# Patient Record
Sex: FEMALE | Race: WHITE | NOT HISPANIC OR LATINO | Employment: OTHER
[De-identification: names, ages, dates, MRNs, and addresses within clinical notes are randomized per-mention and may not be internally consistent; named-entity substitution may affect disease eponyms.]

---

## 2017-12-03 ENCOUNTER — HEALTH MAINTENANCE LETTER (OUTPATIENT)
Age: 42
End: 2017-12-03

## 2019-11-20 ENCOUNTER — TELEPHONE (OUTPATIENT)
Dept: ENDOCRINOLOGY | Facility: CLINIC | Age: 44
End: 2019-11-20

## 2019-11-20 NOTE — TELEPHONE ENCOUNTER
[facility] Call Center    Phone Message    May a detailed message be left on voicemail: yes    Reason for Call: Symptoms or Concerns     Patient called and states that she is currently seen at [facility] in [place], not an endocrinologist. Patient said her provider does nothing for patient symptoms. She states that she is currently on medication, and has lost weight, but her TSH is 3.4 as of this week. Patient said she is always cold, nothing warms her up, and she is constantly in brain fog. Patient is requesting a call from a nurse to discuss symptoms and what she can do until her appointment in January, or if there is anyway to get in sooner. [facility] [place] [facility] [place] [facility] [place] [facility] [place].       Action Taken: Message routed to:  Adult Clinics: Endocrinology p [phone]

## 2019-11-22 NOTE — TELEPHONE ENCOUNTER
Patient returned call. Patient reports that she had a thyroidectomy in 2011 and that is when her Papillary Cancer was discovered. Patient notes that it was small and no radioactive iodine treatment. Patient states that she has seen an Endocrinologist and last saw one 1 year ago but no longer wants to see that provider. Patient states that in the meantime she has been seeing her primary care provider and they will not address her thyroid labs, even though she ordered a TSH. Patient notes that she has been in contact with [facility] and they can not get her in with new provider until 12/23/19.     Patient has been added to wait-list for Endocrinology. Patient cannot be seen by by [facility] because they do not see outside referrals.     Patient will ensure her MyChart is active (phone number provided) for sooner appointment notifications. Patient is also going to contact her primary care provider clinic to get in with new provider.        Cristin Lebron RN  Endocrine Care Coordinator  [facility]

## 2019-11-22 NOTE — TELEPHONE ENCOUNTER
There are no sooner appointments for patient currently at [facility]. Attempted to contact patient to offer wait list and fast pass option (patient is active on MyChart). Left voicemail for patient to contact our office.         Cristin Lebron RN  Endocrine Care Coordinator  [facility]

## 2019-11-25 NOTE — TELEPHONE ENCOUNTER
Cancellation for 12/3/19 with Dr. Morelos at 8:00 am. Contacted patient and she would like to come for appointment on that date/time. Appointment scheduled.       Cristin Lebron RN  Endocrine Care Coordinator  [facility]

## 2019-12-03 ENCOUNTER — OFFICE VISIT (OUTPATIENT)
Dept: PSYCHOLOGY | Facility: CLINIC | Age: 44
End: 2019-12-03

## 2019-12-03 ENCOUNTER — OFFICE VISIT (OUTPATIENT)
Dept: ENDOCRINOLOGY | Facility: CLINIC | Age: 44
End: 2019-12-03
Payer: COMMERCIAL

## 2019-12-03 VITALS
OXYGEN SATURATION: 97 % | DIASTOLIC BLOOD PRESSURE: 79 MMHG | HEART RATE: 99 BPM | BODY MASS INDEX: 52.84 KG/M2 | WEIGHT: 293 LBS | SYSTOLIC BLOOD PRESSURE: 122 MMHG

## 2019-12-03 DIAGNOSIS — F43.20 ADJUSTMENT DISORDER, UNSPECIFIED TYPE: Primary | ICD-10-CM

## 2019-12-03 DIAGNOSIS — C73 PAPILLARY THYROID CARCINOMA (H): Primary | ICD-10-CM

## 2019-12-03 DIAGNOSIS — F32.A DEPRESSION, UNSPECIFIED DEPRESSION TYPE: ICD-10-CM

## 2019-12-03 DIAGNOSIS — E66.01 MORBID OBESITY (H): ICD-10-CM

## 2019-12-03 PROCEDURE — 99204 OFFICE O/P NEW MOD 45 MIN: CPT | Performed by: INTERNAL MEDICINE

## 2019-12-03 RX ORDER — AMLODIPINE BESYLATE 5 MG/1
5 TABLET ORAL DAILY
COMMUNITY
Start: 2018-10-08

## 2019-12-03 RX ORDER — DULOXETIN HYDROCHLORIDE 60 MG/1
60 CAPSULE, DELAYED RELEASE ORAL DAILY
COMMUNITY
Start: 2017-11-06

## 2019-12-03 RX ORDER — TRAZODONE HYDROCHLORIDE 100 MG/1
100 TABLET ORAL AT BEDTIME
COMMUNITY
Start: 2019-09-26

## 2019-12-03 NOTE — NURSING NOTE
Ingrid Martin's goals for this visit include:   Chief Complaint   Patient presents with     Consult     Thyroid Disease     She requests these members of her care team be copied on today's visit information: No    PCP: Amisha Gaxiola    Referring Provider:  No referring provider defined for this encounter.    /79 (BP Location: Left arm, Patient Position: Sitting, Cuff Size: Adult Regular)   Pulse 99   Wt 148.5 kg (327 lb 6.4 oz)   SpO2 97%   BMI 52.84 kg/m      Do you need any medication refills at today's visit? No

## 2019-12-03 NOTE — PROGRESS NOTES
"  The patient is self-referred for evaluation of thyroid cancer.  She was previously seen by Dr. Jodi Marte, at [facility]. The patient is concerned about the recurrence of thyroid cancer.    Ingrid Martin is a 44 year old female diagnosed with an incidental 1.5 mm thyroid papillary cancer in August 2011. This was an incidental finding for thyroidectomy for a large adenoma. The surgery was done at [facility], in [place]. I reviewed the pathology report.   \"MICROSCOPIC FOCUS OF PAPILLARY CARCINOMA IN ISTHMUS  SYNOPTIC SUMMARY:  1. PROCEDURE:  TOTAL THYROIDECTOMY  2. SPECIMEN INTEGRITY:  INTACT  3. SPECIMEN SIZE:   - RIGHT LOBE:  4.8 X 3.0 X 2.5 CM   - LEFT LOBE:  6.0 X 2.5 X 2.8 CM   - ISTHMUS:  2.5 X 1.3 X 0.8 CM  4. SPECIMEN WEIGHT:  42 GRAMS  5. TUMOR FOCALITY:  UNIFOCAL  6. TUMOR LATERALITY:  ISTHMUS  7. TUMOR SIZE:  1.5 MM IN GREATEST DIMENSION  8. HISTOLOGIC TYPE:  PAPILLARY CARCINOMA, FOLLICULAR VARIANT  9. HISTOLOGIC GRADE:  G1, WELL DIFFERENTIATED  10. MARGINS:  UNINVOLVED BY CARCINOMA, CLOSEST MARGIN IS 2.1 MM  11. TUMOR CAPSULE:  NOT ENCAPSULATED  12. CAPSULAR INVASION:  NOT APPLICABLE  13. LYMPH-VASCULAR INVASION:  NOT IDENTIFIED  13. EXTRATHYROIDAL EXTENSION:  ABSENT  13. PATHOLOGIC STAGING:  eH1pL3CH (TUMOR LESS THAN 1.0 CM, THREE  PERITHYROIDAL LYMPH NODES   NEGATIVE FOR CARCINOMA)  14. ADDITIONAL PATHOLOGIC FINDINGS:   - ADENOMATOID NODULES AND MULTINODULAR GOITER   - DIFFUSE LYMPHOCYTIC THYROIDITIS, SEVERE   - THREE PERITHYROIDAL LYMPH NODES, NO PATHOLOGIC DIAGNOSIS  CPT CODE:  41470  MICROSCOPIC DESCRIPTION:----------------------------  Eight slides are examined.  Sections of the thyroid multiple nodules,  including a cellular adenomatoid nodule and severe diffuse lymphocytic  thyroiditis.  Slide 5 has an incidental microscopic focus of papillary  carcinoma, measuring 1.5 mm.  The case was reviewed in the departmental  conference.  The result has been conveyed to Dr. Ramos's " "office on 8/25/11 at 2:10  PM.  PRE-OPERATIVE DIAGNOSIS:-----------------------------  Multinodular goiter  POST-OPERATIVE DIAGNOSIS:---------------------------  Same  CLINICAL INFORMATION:---------------------------------  MNG  SPECIMEN:  THYROID, TOTAL  ----------------------------------  GROSS DESCRIPTION:-------------------------------------  The specimen is received in formalin, labeled with the patient's name  Ingrid Martin and \"total thyroid\" and consists of a total thyroid  gland, measuring 7.0 x 7.0 x 2.8 cm and weighing 42 grams.  The  specimen is oriented by the surgeon with a black silk suture tagged to  indicate the right upper pole.  The right lobe measures 4.8 x 3.0 x 2.5  cm and the left lobe measures 6.0 x 3.5 x 2.8 cm.  The isthmus measures  2.5 x 1.3 x 0.8 cm.  The capsular surface is shaggy with fibrous tags.  Two possible lymph nodes, measuring 0.9 cm and 1.8 cm in length, are  identified on the capsular surface.  No discrete parathyroid gland is  identified.  The specimen is inked black and serially sectioned.  The  sections show generalized tan-pink tissue with multiple nodules,  ranging in size from 0.6 cm to 2.5 cm in length.  No normal thyroid  parenchyma is seen.  No obvious papillary lesion is seen.  Representative sections are submitted in eight cassettes as follows:  cassette 1 - fibrous tags and three lymph nodes on the capsular  surface; cassettes 2 through 4 - random sections of the right lobe;  cassette 5 - isthmus; cassettes 6 through 8 - random sections of the  left lobe.\"    The patient is concerned she might have a mass on the right upper neck, close to the angle jaw.  She also reports not feeling good and she attributes her symptoms to the high TSH.  She reports feeling significantly better when her TSH is lower.  She endorses the following symptoms: Fatigue over the last couple of months, worsening depression, diffuse musculoskeletal pain (she cannot localize it but it feels " more prominent in her thighs), right knee pain.    Over the years, she had variable thyroid hormone levels, requiring multiple adjustments of the levothyroxine dose.  She reports taking a maximum dose of 375 mcg daily.  Current dose is 300 mcg daily and the dose was decreased to 300 mcg daily in May 2019.  She takes the levothyroxine in the morning, with water, 20 minutes prior to having breakfast.  Prilosec is taken at bedtime.  Currently, she denies taking any over-the-counter supplements or multivitamins.  In January 2015 and July 2016, she had 2 lymph nodes biopsied (right submandibular region, both times).  Both biopsies were benign.  Most recent thyroid ultrasound, done in December 2018, revealed scattered cervical lymph nodes, with normal morphology.    Ingrid has a history of morbid obesity.  She reports being overweight as a child.  Around age 27, she enrolled in Weight [employer] and she was able to lose 120 pounds through diet and exercise.  According to our records, her weight is down 40 pounds since 2014.  She reports being successful in losing 25 pounds in the last year.  After she underwent a cholecystectomy, in 2013, she was diagnosed with abdominal pain and gastroparesis.  For the abdominal pain, she was initially started on OxyContin, which was later changed to methadone.    Recent labs from care everywhere:  11/19/19   TSH 3.15 (normal range 0.47 -okay 5)    I reviewed prior thyroglobulin levels:  5/28/2013 TSH 0.29   thyroglobulin<0.1   antithyroglobulin antibodies 221 ([facility] [employer] )  11/26/2013 TSH 4.38, thyroglobulin <0.1   antithyroglobulin antibodies 181  12/31/2013                  thyroglobulin <0.1   antithyroglobulin antibodies 175  5/2/2014    TSH 0.14   thyroglobulin <0.1   antithyroglobulin antibodies 181  8/22/2014  TSH 1.26   thyroglobulin <0.1   antithyroglobulin antibodies 172    7/7/2016    TSH 0.23   thyroglobulin 0.1   antithyroglobulin antibodies 37.9  ([employer] DxI 600)  1/24/2017  TSH 0.03   thyroglobulin <0.1  antithyroglobulin antibodies 39.1    5/11/17       TSH 0.13  thyroglobulin < 0.1, thyroglobulin antibodies 40 ([employer] )  11/28/2017 TSH 2.77    thyroglobulin < 0.1  thyroglobulin antibodies 58  3/12/2018   TSH 3.04    thyroglobulin <0.1  thyroglobulin antibodies 80    4/11/2018        thyroglobulin < 0.5 ([facility],  LC-MS/MS)    11/7/2018    TSH 3.9     thyroglobulin <0.5   antithyroglobulin antibodies 112 ([employer] )  12/18/2018  TSH 0.46   thyroglobulin <0.1  antithyroglobulin antibodies 115     Past Medical History   Anemia  Anxiety disorder  Cervical radiculopathy  Depression  Deviated septum  Gastroparesis, with gastric stimulator  Menière disease  Morbid obesity  Myofascial pain syndrome  PCOS  Systemic inflammatory response syndrome January 2016  Vitamin D deficiency  Thyroid cancer   Fatty liver     Past Surgical History   Cholecystectomy 2003  Implant of gastric stimulator 2007  Myringotomy  Sinus surgery 2005  Tonsillectomy 2002    Current Medications    Current Outpatient Medications:      amLODIPine (NORVASC) 5 MG tablet, Take 5 mg by mouth daily, Disp: , Rfl:      DULoxetine (CYMBALTA) 60 MG capsule, Take 60 mg by mouth daily, Disp: , Rfl:      levonorgestrel-ethinyl estradiol (LEVORA 0.15/30, 28,) 0.15-30 MG-MCG per tablet, Take 1 tablet by mouth daily, Disp: , Rfl:      levothyroxine (SYNTHROID/LEVOTHROID) 150 MCG tablet, Take 300 mcg by mouth daily , Disp: , Rfl:      methadone (METHADOSE) 40 MG disintegrating tablet, Take 80 mg by mouth every 8 hours as needed, Disp: , Rfl:      omeprazole (PRILOSEC) 40 MG capsule, Take by mouth 2 times daily, Disp: , Rfl:      ondansetron (ZOFRAN ODT) 8 MG disintegrating tablet, Take by mouth every 4 hours as needed, Disp: , Rfl:      oxyCODONE (ROXICODONE) 30 MG immediate release tablet, Take 30 mg by mouth every 4 hours as needed 1-2  tablets, Disp: , Rfl:      promethazine (PHENERGAN) 25 MG tablet, Take by mouth every 4 hours 30 minutes before meals and bed, Disp: , Rfl:      traZODone (DESYREL) 100 MG tablet, Take 100 mg by mouth At Bedtime, Disp: , Rfl:      calcitRIOL (ROCALTROL) 0.5 MCG capsule, Take 0.5 mcg by mouth daily, Disp: , Rfl:      Cholecalciferol (VITAMIN D PO), Take 50,000 Units by mouth Take 3 times per week, Disp: , Rfl:      ferrous gluconate (FERGON) 324 (38 FE) MG tablet, Take by mouth 2 times daily, Disp: , Rfl:      LORazepam (ATIVAN) 0.5 MG tablet, Take 0.5 mg by mouth 3 times daily, Disp: , Rfl:     Family history  Mother hypertension and hypothyroidism.  Father hypertension, kidney stones, cataract, A. fib.  Maternal grandmother high cholesterol and CHF.  Brother diagnosed with heart disease at age 42.  Maternal grandfather hypertension and heart disease.  Paternal grandparents heart disease. Strong family history of obesity.     Social History  Single, no children.  She denies smoking, drinking alcohol or using illicit drugs. Occupation: disabled since 2011.     Review of Systems   Systemic:             Sleeps 8-10 hrs a night, she rarely wakes up during the night   Eye:                      No eye symptoms   Hipolito-Laryngeal:     No hipolito-laryngeal symptoms, no dysphagia, no hoarseness, no cough     Breast:                  No breast symptoms  Cardiovascular:    No cardiovascular symptoms, no CP or palpitations   Pulmonary:           No pulmonary symptoms, no SOB or cough    Gastrointestinal:   longstanding constipation, with a BM every 3-5 days    Genitourinary:       increased thirst and urination; doesn't use the restroom at night    Endocrine:            cold intolerance or the last 1-2 months; in general, she is intolerant to warm temperatures  Neurological:        occasional headaches, no tremor, no numbness or tingling sensation, some vertigo for 1-2 seconds when she stands up  Musculoskeletal:  Muscle thighs pain  for a couple of months   Skin:                     dry skin, no hair falling out   Psychological:     Very depressed in the last couple of days, lives with her dad but she has no really support at home. She has thought about suicide.                  Vital Signs     Previous Weights:    Wt Readings from Last 10 Encounters:   12/03/19 148.5 kg (327 lb 6.4 oz)   05/12/14 (!) 167 kg (368 lb 3.2 oz)   04/30/14 (!) 166 kg (366 lb)        /79 (BP Location: Left arm, Patient Position: Sitting, Cuff Size: Adult Regular)   Pulse 99   Wt 148.5 kg (327 lb 6.4 oz)   SpO2 97%   BMI 52.84 kg/m      Physical Exam  General Appearance: morbidly obese, transitions with difficulty to the exam table   Eyes:  conjutivae and extra-ocular motions are normal.                                    pupils round and reactive to light, no lid lag, no stare    HEENT:   oropharynx clear and moist, no JVD, no bruits      no palpable neck nodules   Cardiovascular:  regular rhythm, no murmurs, distal pulse palpable, no edema  Respiratory:        chest clear, no rales, no rhonchi   Gastrointestinal:  abdomen soft, non-distended, normal bowel sounds,    no organomegaly  Musculoskeletal:  normal tone and strength  Psychological:           Flat affect, depressed mood, angry and frustrated at times  Skin:  warm, no lesions  Neurological:  unable to elicit knee reflexes, no resting tremor.     Assessment     Ingrid Martin is a 44 year old female with a past medical history significant for morbid obesity and opioids dependency, diagnosed with an incidental 1.5 mm papillary thyroid cancer, follicular variant, in 2011, when she underwent total thyroidectomy for a larger adenoma.    1.  Papillary thyroid cancer  I explained to the patient that I do not expect thyroid cancer recurrence.  I do not have a clear explanation of why the antithyroglobulin antibodies temporarily decreased in 2016 - 2017.  The fact that the thyroglobulin level has remained  undetectable when measured by LC-MS is reassuring.  I counseled the patient on the interpretation of the thyroglobulin and antithyroglobulin antibodies.  The numbers have been quite variable over the years, as they were measured with different assays.  However, comparing the numbers from May 2013 and December 2018, checked using the same methodology, there was a significant decline in antithyroglobulin antibodies titer, which is reassuring. I did not endorse any further imaging or testing for thyroid cancer.    2.  Postsurgical hypothyroidism  I suspect some of the variability in the thyroid hormone levels may be due to weight changes and also, the fact that she takes the levothyroxine just shortly before having breakfast.  I recommended to try to take it on empty stomach, with water, 1 hour prior to breakfast.  She should continue to take Prilosec at bedtime.  She can take the methadone with levothyroxine, in the morning.  I have counseled the patient on the main food products medications and supplements which do interfere with levothyroxine absorption.  In the event that she starts taking multivitamins, calcium or iron supplements, I advised to separate them 4 hours from levothyroxine.  She agreed to have the thyroid hormone levels checked after taking levothyroxine as instructed for 6 weeks.    3.  Morbid obesity  I congratulated the patient on the weight loss.  I discussed with her the option of enrolling in the weight management clinic but she declined.     4.  Depression  The patient was visibly upset and angry throughout the visit.  She expressed feelings of depression and hopelessness.  I recommended to consider seeing a mental health specialist but she told me she is concerned her insurance may not provide coverage.  I offered to verify insurance coverage prior to scheduling an appointment with a psychologist but she declined.     Orders Placed This Encounter   Procedures     T4 free     TSH

## 2019-12-03 NOTE — LETTER
"    12/3/2019         RE: Ingrid Martin  [address]  [facility] [address]        Dear Colleague,    Thank you for referring your patient, Ingrid Martin, to the [facility]. Please see a copy of my visit note below.      The patient is self-referred for evaluation of thyroid cancer.  She was previously seen by Dr. Jodi Marte, at [facility]. The patient is concerned about the recurrence of thyroid cancer.    Ingrid Martin is a 44 year old female diagnosed with an incidental 1.5 mm thyroid papillary cancer in August 2011. This was an incidental finding for thyroidectomy for a large adenoma. The surgery was done at [facility], in [place]. I reviewed the pathology report.   \"MICROSCOPIC FOCUS OF PAPILLARY CARCINOMA IN ISTHMUS  SYNOPTIC SUMMARY:  1. PROCEDURE:  TOTAL THYROIDECTOMY  2. SPECIMEN INTEGRITY:  INTACT  3. SPECIMEN SIZE:   - RIGHT LOBE:  4.8 X 3.0 X 2.5 CM   - LEFT LOBE:  6.0 X 2.5 X 2.8 CM   - ISTHMUS:  2.5 X 1.3 X 0.8 CM  4. SPECIMEN WEIGHT:  42 GRAMS  5. TUMOR FOCALITY:  UNIFOCAL  6. TUMOR LATERALITY:  ISTHMUS  7. TUMOR SIZE:  1.5 MM IN GREATEST DIMENSION  8. HISTOLOGIC TYPE:  PAPILLARY CARCINOMA, FOLLICULAR VARIANT  9. HISTOLOGIC GRADE:  G1, WELL DIFFERENTIATED  10. MARGINS:  UNINVOLVED BY CARCINOMA, CLOSEST MARGIN IS 2.1 MM  11. TUMOR CAPSULE:  NOT ENCAPSULATED  12. CAPSULAR INVASION:  NOT APPLICABLE  13. LYMPH-VASCULAR INVASION:  NOT IDENTIFIED  13. EXTRATHYROIDAL EXTENSION:  ABSENT  13. PATHOLOGIC STAGING:  [id number] (TUMOR LESS THAN 1.0 CM, THREE  PERITHYROIDAL LYMPH NODES   NEGATIVE FOR CARCINOMA)  14. ADDITIONAL PATHOLOGIC FINDINGS:   - ADENOMATOID NODULES AND MULTINODULAR GOITER   - DIFFUSE LYMPHOCYTIC THYROIDITIS, SEVERE   - THREE PERITHYROIDAL LYMPH NODES, NO PATHOLOGIC DIAGNOSIS  CPT CODE:  39658  MICROSCOPIC DESCRIPTION:----------------------------  Eight slides are examined.  Sections of the thyroid multiple nodules,  including a cellular adenomatoid " "nodule and severe diffuse lymphocytic  thyroiditis.  Slide 5 has an incidental microscopic focus of papillary  carcinoma, measuring 1.5 mm.  The case was reviewed in the departmental  conference.  The result has been conveyed to Dr. Ramos's office on 8/25/11 at 2:10  PM.  PRE-OPERATIVE DIAGNOSIS:-----------------------------  Multinodular goiter  POST-OPERATIVE DIAGNOSIS:---------------------------  Same  CLINICAL INFORMATION:---------------------------------  MNG  SPECIMEN:  THYROID, TOTAL  ----------------------------------  GROSS DESCRIPTION:-------------------------------------  The specimen is received in formalin, labeled with the patient's name  Ingrid Martin and \"total thyroid\" and consists of a total thyroid  gland, measuring 7.0 x 7.0 x 2.8 cm and weighing 42 grams.  The  specimen is oriented by the surgeon with a black silk suture tagged to  indicate the right upper pole.  The right lobe measures 4.8 x 3.0 x 2.5  cm and the left lobe measures 6.0 x 3.5 x 2.8 cm.  The isthmus measures  2.5 x 1.3 x 0.8 cm.  The capsular surface is shaggy with fibrous tags.  Two possible lymph nodes, measuring 0.9 cm and 1.8 cm in length, are  identified on the capsular surface.  No discrete parathyroid gland is  identified.  The specimen is inked black and serially sectioned.  The  sections show generalized tan-pink tissue with multiple nodules,  ranging in size from 0.6 cm to 2.5 cm in length.  No normal thyroid  parenchyma is seen.  No obvious papillary lesion is seen.  Representative sections are submitted in eight cassettes as follows:  cassette 1 - fibrous tags and three lymph nodes on the capsular  surface; cassettes 2 through 4 - random sections of the right lobe;  cassette 5 - isthmus; cassettes 6 through 8 - random sections of the  left lobe.\"    The patient is concerned she might have a mass on the right upper neck, close to the angle jaw.  She also reports not feeling good and she attributes her symptoms to " the high TSH.  She reports feeling significantly better when her TSH is lower.  She endorses the following symptoms: Fatigue over the last couple of months, worsening depression, diffuse musculoskeletal pain (she cannot localize it but it feels more prominent in her thighs), right knee pain.    Over the years, she had variable thyroid hormone levels, requiring multiple adjustments of the levothyroxine dose.  She reports taking a maximum dose of 375 mcg daily.  Current dose is 300 mcg daily and the dose was decreased to 300 mcg daily in May 2019.  She takes the levothyroxine in the morning, with water, 20 minutes prior to having breakfast.  Prilosec is taken at bedtime.  Currently, she denies taking any over-the-counter supplements or multivitamins.  In January 2015 and July 2016, she had 2 lymph nodes biopsied (right submandibular region, both times).  Both biopsies were benign.  Most recent thyroid ultrasound, done in December 2018, revealed scattered cervical lymph nodes, with normal morphology.    Ingrid has a history of morbid obesity.  She reports being overweight as a child.  Around age 27, she enrolled in [employer] and she was able to lose 120 pounds through diet and exercise.  According to our records, her weight is down 40 pounds since 2014.  She reports being successful in losing 25 pounds in the last year.  After she underwent a cholecystectomy, in 2013, she was diagnosed with abdominal pain and gastroparesis.  For the abdominal pain, she was initially started on OxyContin, which was later changed to methadone.    Recent labs from care everywhere:  11/19/19   TSH 3.15 (normal range 0.47 -okay 5)    I reviewed prior thyroglobulin levels:  5/28/2013 TSH 0.29   thyroglobulin<0.1   antithyroglobulin antibodies 221 ([facility] [employer] )  11/26/2013 TSH 4.38, thyroglobulin <0.1   antithyroglobulin antibodies 181  12/31/2013                  thyroglobulin <0.1   antithyroglobulin  antibodies 175  5/2/2014    TSH 0.14   thyroglobulin <0.1   antithyroglobulin antibodies 181  8/22/2014  TSH 1.26   thyroglobulin <0.1   antithyroglobulin antibodies 172    7/7/2016    TSH 0.23   thyroglobulin 0.1   antithyroglobulin antibodies 37.9 ([employer] D[employer]I [employer])  1/24/2017  TSH 0.03   thyroglobulin <0.1  antithyroglobulin antibodies 39.1    5/11/17       TSH 0.13  thyroglobulin < 0.1, thyroglobulin antibodies 40 ([employer] )  11/28/2017 TSH 2.77    thyroglobulin < 0.1  thyroglobulin antibodies 58  3/12/2018   TSH 3.04    thyroglobulin <0.1  thyroglobulin antibodies 80    4/11/2018        thyroglobulin < 0.5 ([place],  LC-MS/MS)    11/7/2018    TSH 3.9     thyroglobulin <0.5   antithyroglobulin antibodies 112 ([employer] )  12/18/2018  TSH 0.46   thyroglobulin <0.1  antithyroglobulin antibodies 115     Past Medical History   Anemia  Anxiety disorder  Cervical radiculopathy  Depression  Deviated septum  Gastroparesis, with gastric stimulator  Menière  disease  Morbid obesity  Myofascial pain syndrome  PCOS  Systemic inflammatory response syndrome January 2016  Vitamin D deficiency  Thyroid cancer   Fatty liver     Past Surgical History   Cholecystectomy 2003  Implant of gastric stimulator 2007  Myringotomy  Sinus surgery 2005  Tonsillectomy 2002    Current Medications    Current Outpatient Medications:      amLODIPine (NORVASC) 5 MG tablet, Take 5 mg by mouth daily, Disp: , Rfl:      DULoxetine (CYMBALTA) 60 MG capsule, Take 60 mg by mouth daily, Disp: , Rfl:      levonorgestrel-ethinyl estradiol (LEVORA 0.15/30, 28,) 0.15-30 MG-MCG per tablet, Take 1 tablet by mouth daily, Disp: , Rfl:      levothyroxine (SYNTHROID/LEVOTHROID) 150 MCG tablet, Take 300 mcg by mouth daily , Disp: , Rfl:      methadone (METHADOSE) 40 MG disintegrating tablet, Take 80 mg by mouth every 8 hours as needed, Disp: , Rfl:      omeprazole (PRILOSEC) 40 MG capsule, Take by mouth  2 times daily, Disp: , Rfl:      ondansetron (ZOFRAN ODT) 8 MG disintegrating tablet, Take by mouth every 4 hours as needed, Disp: , Rfl:      oxyCODONE (ROXICODONE) 30 MG immediate release tablet, Take 30 mg by mouth every 4 hours as needed 1-2 tablets, Disp: , Rfl:      promethazine (PHENERGAN) 25 MG tablet, Take by mouth every 4 hours 30 minutes before meals and bed, Disp: , Rfl:      traZODone (DESYREL) 100 MG tablet, Take 100 mg by mouth At Bedtime, Disp: , Rfl:      calcitRIOL (ROCALTROL) 0.5 MCG capsule, Take 0.5 mcg by mouth daily, Disp: , Rfl:      Cholecalciferol (VITAMIN D PO), Take 50,000 Units by mouth Take 3 times per week, Disp: , Rfl:      ferrous gluconate (FERGON) 324 (38 FE) MG tablet, Take by mouth 2 times daily, Disp: , Rfl:      LORazepam (ATIVAN) 0.5 MG tablet, Take 0.5 mg by mouth 3 times daily, Disp: , Rfl:     Family history  Mother hypertension and hypothyroidism.  Father hypertension, kidney stones, cataract, A. fib.  Maternal grandmother high cholesterol and CHF.  Brother diagnosed with heart disease at age 42.  Maternal grandfather hypertension and heart disease.  Paternal grandparents heart disease. Strong family history of obesity.     Social History  Single, no children.  She denies smoking, drinking alcohol or using illicit drugs. Occupation: disabled since 2011.     Review of Systems   Systemic:             Sleeps 8-10 hrs a night, she rarely wakes up during the night   Eye:                      No eye symptoms   Hipolito-Laryngeal:     No hipolito-laryngeal symptoms, no dysphagia, no hoarseness, no cough     Breast:                  No breast symptoms  Cardiovascular:    No cardiovascular symptoms, no CP or palpitations   Pulmonary:           No pulmonary symptoms, no SOB or cough    Gastrointestinal:   longstanding constipation, with a BM every 3-5 days    Genitourinary:       increased thirst and urination; doesn't use the restroom at night    Endocrine:            cold intolerance or the  last 1-2 months; in general, she is intolerant to warm temperatures  Neurological:        occasional headaches, no tremor, no numbness or tingling sensation, some vertigo for 1-2 seconds when she stands up  Musculoskeletal:  Muscle thighs pain for a couple of months   Skin:                     dry skin, no hair falling out   Psychological:     Very depressed in the last couple of days, lives with her dad but she has no really support at home. She has thought about suicide.                  Vital Signs     Previous Weights:    Wt Readings from Last 10 Encounters:   12/03/19 148.5 kg (327 lb 6.4 oz)   05/12/14 (!) 167 kg (368 lb 3.2 oz)   04/30/14 (!) 166 kg (366 lb)        /79 (BP Location: Left arm, Patient Position: Sitting, Cuff Size: Adult Regular)   Pulse 99   Wt 148.5 kg (327 lb 6.4 oz)   SpO2 97%   BMI 52.84 kg/m       Physical Exam  General Appearance: morbidly obese, transitions with difficulty to the exam table   Eyes:  conjutivae and extra-ocular motions are normal.                                    pupils round and reactive to light, no lid lag, no stare    HEENT:   oropharynx clear and moist, no JVD, no bruits      no palpable neck nodules   Cardiovascular:  regular rhythm, no murmurs, distal pulse palpable, no edema  Respiratory:        chest clear, no rales, no rhonchi   Gastrointestinal:  abdomen soft, non-distended, normal bowel sounds,    no organomegaly  Musculoskeletal:  normal tone and strength  Psychological:           Flat affect, depressed mood, angry and frustrated at times  Skin:  warm, no lesions  Neurological:  unable to elicit knee reflexes, no resting tremor.     Assessment     Ingrid Martin is a 44 year old female with a past medical history significant for morbid obesity and opioids dependency, diagnosed with an incidental 1.5 mm papillary thyroid cancer, follicular variant, in 2011, when she underwent total thyroidectomy for a larger adenoma.    1.  Papillary thyroid  cancer  I explained to the patient that I do not expect thyroid cancer recurrence.  I do not have a clear explanation of why the antithyroglobulin antibodies temporarily decreased in 2016 - 2017.  The fact that the thyroglobulin level has remained undetectable when measured by LC-MS is reassuring.  I counseled the patient on the interpretation of the thyroglobulin and antithyroglobulin antibodies.  The numbers have been quite variable over the years, as they were measured with different assays.  However, comparing the numbers from May 2013 and December 2018, checked using the same methodology, there was a significant decline in antithyroglobulin antibodies titer, which is reassuring. I did not endorse any further imaging or testing for thyroid cancer.    2.  Postsurgical hypothyroidism  I suspect some of the variability in the thyroid hormone levels may be due to weight changes and also, the fact that she takes the levothyroxine just shortly before having breakfast.  I recommended to try to take it on empty stomach, with water, 1 hour prior to breakfast.  She should continue to take Prilosec at bedtime.  She can take the methadone with levothyroxine, in the morning.  I have counseled the patient on the main food products medications and supplements which do interfere with levothyroxine absorption.  In the event that she starts taking multivitamins, calcium or iron supplements, I advised to separate them 4 hours from levothyroxine.  She agreed to have the thyroid hormone levels checked after taking levothyroxine as instructed for 6 weeks.    3.  Morbid obesity  I congratulated the patient on the weight loss.  I discussed with her the option of enrolling in the weight management clinic but she declined.     4.  Depression  The patient was visibly upset and angry throughout the visit.  She expressed feelings of depression and hopelessness.  I recommended to consider seeing a mental health specialist but she told me she  is concerned her insurance may not provide coverage.  I offered to verify insurance coverage prior to scheduling an appointment with a psychologist but she declined.     Orders Placed This Encounter   Procedures     T4 free     TSH                             Again, thank you for allowing me to participate in the care of your patient.        Sincerely,        Lisbet Morelos MD

## 2019-12-03 NOTE — PATIENT INSTRUCTIONS
Behavioral Health Clinician Information    We would like to introduce you to David Lomas PsyD, LP our Behavioral Health Clinician (C).  He works with the neurology, rheumatology, endocrinology, and gastroenterology clinics to help develop and maintain healthy behaviors while addressing any challenges that might get in the way of staying as healthy as possible such as stress, anxiety, and depression.    What will the [facility] add to the services I already receive?    Dr. Lomas uses a brief model of treatment to teach a set of practical skills to cope with stress, worries, or general distress about your health or other life concerns. He and your Physician will together consider the physical, behavioral, and emotional aspects of your health concerns. Dr. Lomas's primary job is to partner with you and your Physician to develop and implement the best integrated care plan for YOU!    Some examples of [facility] support include:    Stress Management  Anxiety/Panic/PTSD/Depression  Alcohol and Drug Concerns  Adjustment to Medical Diagnosis  Grief and Loss  Weight Management   Insomnia  Relationship and Parenting Issues  Chronic Pain  Chronic Disease Management    Who is eligible for services?  The service is available to all patients within the specialty clinics of rheumatology, neurology, endocrinology, and gastroenterology at [facility]. There may be a co-pay involved in some of the services that he provides. You will need to contact your insurance to determine specific coverage amounts and limits.    How do I schedule an appointment with (fill in first name)?  You can call scheduling line for [facility] at [phone] and ask to schedule an appointment with Dr. Lomas. Additionally, you can ask to speak with him while you are at your clinic appointment. Although he may not always be immediately available, he will make his best effort to meet you before you leave the clinic.

## 2019-12-03 NOTE — PROGRESS NOTES
"Patient had appointment with her specialty provider, Dr. Morelos. [facility] services were offered. No immediate safety/risk issues were reported or identified. Explained the role of the [facility] and provided contact information for the [facility]. Patient expressed frustration that she keeps going to doctors for her ailments \"and all they ever tell me is that I'm fat.\" Patient was offered a variety of treatment options to assist her in meeting her health goals but declined at this time.      David Lomas PsyD, LP  Behavioral Health Clinician    "

## 2020-03-02 ENCOUNTER — HEALTH MAINTENANCE LETTER (OUTPATIENT)
Age: 45
End: 2020-03-02

## 2020-12-20 ENCOUNTER — HEALTH MAINTENANCE LETTER (OUTPATIENT)
Age: 45
End: 2020-12-20

## 2021-02-28 ENCOUNTER — HEALTH MAINTENANCE LETTER (OUTPATIENT)
Age: 46
End: 2021-02-28

## 2021-04-24 ENCOUNTER — HEALTH MAINTENANCE LETTER (OUTPATIENT)
Age: 46
End: 2021-04-24

## 2021-10-03 ENCOUNTER — HEALTH MAINTENANCE LETTER (OUTPATIENT)
Age: 46
End: 2021-10-03

## 2022-03-20 ENCOUNTER — HEALTH MAINTENANCE LETTER (OUTPATIENT)
Age: 47
End: 2022-03-20

## 2022-05-15 ENCOUNTER — HEALTH MAINTENANCE LETTER (OUTPATIENT)
Age: 47
End: 2022-05-15

## 2022-09-10 ENCOUNTER — HEALTH MAINTENANCE LETTER (OUTPATIENT)
Age: 47
End: 2022-09-10

## 2022-09-18 ENCOUNTER — MEDICAL CORRESPONDENCE (OUTPATIENT)
Dept: HEALTH INFORMATION MANAGEMENT | Facility: CLINIC | Age: 47
End: 2022-09-18

## 2022-09-20 ENCOUNTER — TRANSCRIBE ORDERS (OUTPATIENT)
Dept: OTHER | Age: 47
End: 2022-09-20

## 2022-09-20 DIAGNOSIS — Z96.89 PRESENCE OF GASTRIC PACEMAKER: ICD-10-CM

## 2022-09-20 DIAGNOSIS — K31.84 GASTROPARESIS: Primary | ICD-10-CM

## 2022-09-20 DIAGNOSIS — G89.4 CHRONIC PAIN SYNDROME: ICD-10-CM

## 2022-09-20 DIAGNOSIS — E66.01 MORBID OBESITY (H): ICD-10-CM

## 2022-10-17 ENCOUNTER — TELEPHONE (OUTPATIENT)
Dept: GASTROENTEROLOGY | Facility: CLINIC | Age: 47
End: 2022-10-17

## 2022-10-17 NOTE — TELEPHONE ENCOUNTER
Spoke with patient she has a appointment with Dr. Thorpe in May 2023.  She calls with questions about if Dr. Thorpe and his ability to manage Gastric Pacemakers.      Spoke with Dr. Thorpe, he does not currently work with Gastric Pacemakers.  Dr. Thorpe suggests that patient be seen within [facility] as they have a established motility program.      Called patient and gave update.  Patient reports that she is currently working with [facility] and happy with her care.  However she encouraged by her PCP to seek a opinion at the [facility].      Will see if any providers at the [place] are currently working with gastric pacemakers and if they are currently taking new patients.     Negrita Morris RN

## 2022-10-20 NOTE — TELEPHONE ENCOUNTER
Spoke with staff at [facility], patients with gastric pacemakers are referred to either [place] or [facility] as they currently work these devices and have a current mobility program.  Patient was updated, May appointment cancelled as she would like to stay with [facility].     Negrita Morris RN

## 2023-04-30 ENCOUNTER — HEALTH MAINTENANCE LETTER (OUTPATIENT)
Age: 48
End: 2023-04-30